# Patient Record
Sex: MALE | Race: WHITE
[De-identification: names, ages, dates, MRNs, and addresses within clinical notes are randomized per-mention and may not be internally consistent; named-entity substitution may affect disease eponyms.]

---

## 2019-11-20 ENCOUNTER — HOSPITAL ENCOUNTER (OUTPATIENT)
Dept: HOSPITAL 74 - JASU-SURG | Age: 64
Discharge: HOME | End: 2019-11-20
Attending: OPHTHALMOLOGY
Payer: COMMERCIAL

## 2019-11-20 VITALS — HEART RATE: 51 BPM | SYSTOLIC BLOOD PRESSURE: 107 MMHG | DIASTOLIC BLOOD PRESSURE: 70 MMHG

## 2019-11-20 VITALS — BODY MASS INDEX: 29.5 KG/M2

## 2019-11-20 VITALS — TEMPERATURE: 97.7 F

## 2019-11-20 DIAGNOSIS — H26.9: Primary | ICD-10-CM

## 2019-11-20 DIAGNOSIS — I10: ICD-10-CM

## 2019-11-20 DIAGNOSIS — H52.202: ICD-10-CM

## 2019-11-20 PROCEDURE — 08RK3JZ REPLACEMENT OF LEFT LENS WITH SYNTHETIC SUBSTITUTE, PERCUTANEOUS APPROACH: ICD-10-PCS | Performed by: OPHTHALMOLOGY

## 2019-11-20 RX ADMIN — TROPICAMIDE SCH DROP: 10 SOLUTION/ DROPS OPHTHALMIC at 08:15

## 2019-11-20 RX ADMIN — OFLOXACIN SCH DROP: 3 SOLUTION/ DROPS OPHTHALMIC at 08:10

## 2019-11-20 RX ADMIN — OFLOXACIN SCH DROP: 3 SOLUTION/ DROPS OPHTHALMIC at 08:15

## 2019-11-20 RX ADMIN — CYCLOPENTOLATE HYDROCHLORIDE SCH DROP: 10 SOLUTION/ DROPS OPHTHALMIC at 08:15

## 2019-11-20 RX ADMIN — KETOROLAC TROMETHAMINE SCH DROP: 5 SOLUTION OPHTHALMIC at 08:05

## 2019-11-20 RX ADMIN — KETOROLAC TROMETHAMINE SCH DROP: 5 SOLUTION OPHTHALMIC at 08:10

## 2019-11-20 RX ADMIN — PHENYLEPHRINE HYDROCHLORIDE SCH DROP: 0.25 SPRAY NASAL at 08:15

## 2019-11-20 RX ADMIN — CYCLOPENTOLATE HYDROCHLORIDE SCH DROP: 10 SOLUTION/ DROPS OPHTHALMIC at 08:10

## 2019-11-20 RX ADMIN — PHENYLEPHRINE HYDROCHLORIDE SCH DROP: 0.25 SPRAY NASAL at 08:05

## 2019-11-20 RX ADMIN — PHENYLEPHRINE HYDROCHLORIDE SCH DROP: 0.25 SPRAY NASAL at 08:10

## 2019-11-20 RX ADMIN — TROPICAMIDE SCH DROP: 10 SOLUTION/ DROPS OPHTHALMIC at 08:05

## 2019-11-20 RX ADMIN — TROPICAMIDE SCH DROP: 10 SOLUTION/ DROPS OPHTHALMIC at 08:10

## 2019-11-20 RX ADMIN — KETOROLAC TROMETHAMINE SCH DROP: 5 SOLUTION OPHTHALMIC at 08:15

## 2019-11-20 RX ADMIN — OFLOXACIN SCH DROP: 3 SOLUTION/ DROPS OPHTHALMIC at 08:05

## 2019-11-20 RX ADMIN — CYCLOPENTOLATE HYDROCHLORIDE SCH DROP: 10 SOLUTION/ DROPS OPHTHALMIC at 08:05

## 2019-11-20 NOTE — OP
DATE OF OPERATION:  11/20/2019

 

PREOPERATIVE DIAGNOSES:  Cataract, left eye, astigmatism.

 

POSTOPERATIVE DIAGNOSES:  Cataract, left eye, astigmatism.

 

OPERATION:  Phacoemulsification of left cataract with toric posterior chamber

intraocular lens implantation.  Lens used [id number], power 18.5 diopters, serial no.

[id number].

 

ANESTHESIA:  Topical/MAC.

 

COMPLICATIONS:  None.

 

PROCEDURE:  The patient was brought into the operating room and correctly identified

along with the operative site as well as correct intraocular lens carbone.  With the

patient sitting upright, the 3, 6 and 9 o'clock positions were then marked with a

toric marker.  The patient was then reclined and the markings were reinforced

underneath the microscope.  He was then prepped and draped in the usual sterile

fashion, including 5% Betadine solution in the conjunctival sac and an eyelid drape. 

An eyelid speculum was then placed into the left eye.  Using the toric markings, the

180-degree position was marked, as well as the 15-degree position marked on the eye. 

A paracentesis port was created and intracameral lidocaine was given 1%

preservative-free of 0.5 mL.  Viscoelastic was then injected to inflate the anterior

chamber.

 

A temporal clear corneal wound was initiated at 15 degrees.  A continuous circular

capsulorrhexis was performed and the nucleus hydrodissected and removed with

phacoemulsification via a divide-and-conquer approach.  The remaining cortical

material was irrigated and aspirated from the eye.  Viscoelastic was injected to

inflate the capsular bag.  The lens was injected into the capsular bag.  It was then

rotated clockwise, until it was approximately 15 degrees from its final resting

position.  The remaining Viscoelastic was then irrigated and aspirated from the eye. 

Using a combination of irrigation with a Sinskey hook, the lens was rotated into its

final resting position just slightly counterclockwise to 180 degrees.  All wounds

were tested and found to be watertight.  No sutures were placed.  At the end of the

procedure, the intraocular lens was noted to be well centered and aligned at its

intended axis.  No sutures were placed.  Topical vancomycin given, the eye patched

and shielded, and the patient discharged from the operating room in a stable

condition.

 

 

ZAK JOINER M.D.

 

[id number]

DD: 11/20/2019 09:56

DT: 11/20/2019 14:57

Job #:  [id number]